# Patient Record
Sex: MALE | Race: OTHER | NOT HISPANIC OR LATINO
[De-identification: names, ages, dates, MRNs, and addresses within clinical notes are randomized per-mention and may not be internally consistent; named-entity substitution may affect disease eponyms.]

---

## 2021-03-11 PROBLEM — Z00.00 ENCOUNTER FOR PREVENTIVE HEALTH EXAMINATION: Status: ACTIVE | Noted: 2021-03-11

## 2021-03-18 ENCOUNTER — APPOINTMENT (OUTPATIENT)
Dept: PEDIATRIC ORTHOPEDIC SURGERY | Facility: CLINIC | Age: 21
End: 2021-03-18
Payer: COMMERCIAL

## 2021-03-18 DIAGNOSIS — Z78.9 OTHER SPECIFIED HEALTH STATUS: ICD-10-CM

## 2021-03-18 PROCEDURE — 99072 ADDL SUPL MATRL&STAF TM PHE: CPT

## 2021-03-18 PROCEDURE — 99203 OFFICE O/P NEW LOW 30 MIN: CPT

## 2021-03-18 NOTE — ASSESSMENT
[FreeTextEntry1] : Had a long discussion with the family about treating back pain. We decided to start with:\par \par 1. A course of physical therapy directed at strengthening their back muscles to alleviate the pain. \par \par 2. If after 6 weeks of physical therapy, if they're not better we will refer them for an injection

## 2021-03-18 NOTE — HISTORY OF PRESENT ILLNESS
[Pain Location] : pain [Lumbar] : lumbar region [Stable] : stable [5] : a minimum pain level of 5/10 [Walking] : walking [Sitting] : sitting [Constant] : ~He/She~ states the symptoms seem to be constant [Prolonged Sitting] : worsened by prolonged sitting [Prolonged Standing] : worsened by prolonged standing [Rest] : relieved by rest [de-identified] : Gagan is here today for back pain after shoveling snow. During the last snow storm, he was shoveling snow and felt a twinge in his back. The pain got so back he had to go to an urgent care. They took and xray and told him to rest. He recently was evaluated by his family doctor, who ordered an MRI to assure everything was ok. He is here today to follow up.  [Ataxia] : no ataxia [Loss of Dexterity] : good dexterity

## 2021-03-18 NOTE — PHYSICAL EXAM
[] : Motor: [NL] : normal and symmetric bilaterally [UE/LE] : Sensory: Intact in bilateral upper & lower extremities [ALL] : dorsalis pedis, posterior tibial, femoral, popliteal, and radial 2+ and symmetric bilaterally [Normal] : Oriented to person, place, and time, insight and judgement were intact and the affect was normal [Poor Appearance] : well-appearing [Acute Distress] : not in acute distress [de-identified] : Xray [facility] 2/23/21\par There are 12 rib-bearing thoracic vertebral bodies and 5 nonrib-bearing lumbar vertebral bodies.\par There is a mild dextroconvex curvature of the lumbar spine centered at the level of L3. Using the method of Blanco, measuring from the superior endplate of L1 to the inferior endplate of L4, there is 5 degrees of dextroconvex curvature.\par There is no coronal imbalance. There is a mild right-side up pelvic tilt.\par The vertebral body heights and configuration are grossly maintained. The sacroiliac joints and symphysis pubis are grossly normal.\par \par MRI 3/10/21\par The normal lumbar lordosis is maintained. The vertebral bodies maintain normal height and demonstrate normal marrow signal. Intervertebral disc space heights are maintained. The conus medullaris terminates at the level of L1/L2 and demonstrates normal signal characteristics.\par At L5/S1, there is a mild central disc herniation which flattens the ventral thecal sac however there is no central canal stenosis or neural foraminal narrowing.\par There is no disc herniation, central canal stenosis or neural foraminal narrowing at any additional lumbar level.\par \par I visually reviewed the images\par

## 2021-06-03 ENCOUNTER — APPOINTMENT (OUTPATIENT)
Dept: PEDIATRIC ORTHOPEDIC SURGERY | Facility: CLINIC | Age: 21
End: 2021-06-03
Payer: COMMERCIAL

## 2021-06-03 DIAGNOSIS — M54.5 LOW BACK PAIN: ICD-10-CM

## 2021-06-03 PROCEDURE — 99213 OFFICE O/P EST LOW 20 MIN: CPT

## 2021-06-03 RX ORDER — METHYLPREDNISOLONE 4 MG/1
4 TABLET ORAL
Qty: 1 | Refills: 0 | Status: COMPLETED | COMMUNITY
Start: 2021-03-18 | End: 2021-06-03

## 2021-06-03 NOTE — HISTORY OF PRESENT ILLNESS
[de-identified] : Gagan is here today to follow up on back pain. We ordered PT and a Medrol dose pack last time. They're here today for follow up.\par Previously,\par Gagan is here today for back pain after shoveling snow. During the last snow storm, he was shoveling snow and felt a twinge in his back. The pain got so back he had to go to an urgent care. They took and xray and told him to rest. He recently was evaluated by his family doctor, who ordered an MRI to assure everything was ok. He is here today to follow up.

## 2021-06-03 NOTE — ASSESSMENT
[FreeTextEntry1] : Had a long discussion with the family about treating back pain. We decided to start with:\par \par 1. A course of physical therapy directed at strengthening their back muscles to alleviate the pain. \par \par 2. l refer them for an injection\par \par 3. Repeat MRI

## 2021-06-03 NOTE — PHYSICAL EXAM
[] : Motor: [NL] : normal and symmetric bilaterally [UE/LE] : Sensory: Intact in bilateral upper & lower extremities [ALL] : dorsalis pedis, posterior tibial, femoral, popliteal, and radial 2+ and symmetric bilaterally [Poor Appearance] : well-appearing [Acute Distress] : not in acute distress [Normal] : Oriented to person, place, and time, insight and judgement were intact and the affect was normal [de-identified] : Xray [facility] 2/23/21\par There are 12 rib-bearing thoracic vertebral bodies and 5 nonrib-bearing lumbar vertebral bodies.\par There is a mild dextroconvex curvature of the lumbar spine centered at the level of L3. Using the method of Blanco, measuring from the superior endplate of L1 to the inferior endplate of L4, there is 5 degrees of dextroconvex curvature.\par There is no coronal imbalance. There is a mild right-side up pelvic tilt.\par The vertebral body heights and configuration are grossly maintained. The sacroiliac joints and symphysis pubis are grossly normal.\par \par MRI 3/10/21\par The normal lumbar lordosis is maintained. The vertebral bodies maintain normal height and demonstrate normal marrow signal. Intervertebral disc space heights are maintained. The conus medullaris terminates at the level of L1/L2 and demonstrates normal signal characteristics.\par At L5/S1, there is a mild central disc herniation which flattens the ventral thecal sac however there is no central canal stenosis or neural foraminal narrowing.\par There is no disc herniation, central canal stenosis or neural foraminal narrowing at any additional lumbar level.\par \par I visually reviewed the images\par

## 2021-06-05 ENCOUNTER — OUTPATIENT (OUTPATIENT)
Dept: OUTPATIENT SERVICES | Facility: HOSPITAL | Age: 21
LOS: 1 days | Discharge: HOME | End: 2021-06-05
Payer: COMMERCIAL

## 2021-06-05 DIAGNOSIS — M54.5 LOW BACK PAIN: ICD-10-CM

## 2021-06-05 PROCEDURE — 72148 MRI LUMBAR SPINE W/O DYE: CPT | Mod: 26

## 2024-06-20 ENCOUNTER — APPOINTMENT (OUTPATIENT)
Dept: ORTHOPEDIC SURGERY | Facility: CLINIC | Age: 24
End: 2024-06-20

## 2024-06-26 ENCOUNTER — APPOINTMENT (OUTPATIENT)
Dept: ORTHOPEDIC SURGERY | Facility: CLINIC | Age: 24
End: 2024-06-26
Payer: COMMERCIAL

## 2024-06-26 DIAGNOSIS — S43.431D SUPERIOR GLENOID LABRUM LESION OF RIGHT SHOULDER, SUBSEQUENT ENCOUNTER: ICD-10-CM

## 2024-06-26 DIAGNOSIS — M25.511 PAIN IN RIGHT SHOULDER: ICD-10-CM

## 2024-06-26 PROCEDURE — 73030 X-RAY EXAM OF SHOULDER: CPT | Mod: RT

## 2024-06-26 PROCEDURE — 99213 OFFICE O/P EST LOW 20 MIN: CPT

## 2024-06-26 RX ORDER — MELOXICAM 15 MG/1
15 TABLET ORAL
Qty: 30 | Refills: 0 | Status: ACTIVE | COMMUNITY
Start: 2024-06-26 | End: 1900-01-01

## 2024-07-26 ENCOUNTER — RX RENEWAL (OUTPATIENT)
Age: 24
End: 2024-07-26

## 2024-08-06 ENCOUNTER — APPOINTMENT (OUTPATIENT)
Dept: ORTHOPEDIC SURGERY | Facility: CLINIC | Age: 24
End: 2024-08-06

## 2024-08-06 PROCEDURE — 99213 OFFICE O/P EST LOW 20 MIN: CPT

## 2024-08-06 NOTE — DISCUSSION/SUMMARY
[de-identified] : Today we discussed a treatment plan.  His shoulder has improved nicely so we will continue with formal physical therapy.  Right now he is not interested in surgery to address his labral tear.  He may work out at the gym, he was advised to avoid push-ups, planks, pull-ups and dips.  He can do flies to work at his chest.  I will see him back in 3 months for further evaluation, if his symptoms worsen prior to that he will call me sooner and we can bring him in for a cortisone injection for the right shoulder.

## 2024-08-06 NOTE — HISTORY OF PRESENT ILLNESS
[de-identified] : The patient is a 23-year-old male here for a subsequent reevaluation of his right shoulder.  MRI came back showing a SLAP tear with some rotator cuff tendinosis.  He states his shoulder is improving.  He is doing formal physical therapy [facility].  He still gets some cracking in the shoulder, the crack is not associated with pain.

## 2024-08-06 NOTE — IMAGING
[de-identified] : Physical exam the right shoulder: Active range of motion forward flexion to 180 degrees, active range of motion with abduction to 170 degrees, active range of motion with rotation L2.  Good strength with rotator cuff resistance testing, positive impingement testing, positive Issaquena's testing.  Intact to light touch.

## 2024-12-10 ENCOUNTER — APPOINTMENT (OUTPATIENT)
Facility: CLINIC | Age: 24
End: 2024-12-10

## 2025-01-07 ENCOUNTER — APPOINTMENT (OUTPATIENT)
Facility: CLINIC | Age: 25
End: 2025-01-07

## 2025-02-17 ENCOUNTER — APPOINTMENT (OUTPATIENT)
Dept: ORTHOPEDIC SURGERY | Facility: CLINIC | Age: 25
End: 2025-02-17
Payer: COMMERCIAL

## 2025-02-17 DIAGNOSIS — S43.431D SUPERIOR GLENOID LABRUM LESION OF RIGHT SHOULDER, SUBSEQUENT ENCOUNTER: ICD-10-CM

## 2025-02-17 PROCEDURE — 99213 OFFICE O/P EST LOW 20 MIN: CPT

## 2025-05-19 ENCOUNTER — APPOINTMENT (OUTPATIENT)
Dept: ORTHOPEDIC SURGERY | Facility: CLINIC | Age: 25
End: 2025-05-19